# Patient Record
Sex: FEMALE | Race: WHITE | NOT HISPANIC OR LATINO | Employment: FULL TIME | URBAN - METROPOLITAN AREA
[De-identification: names, ages, dates, MRNs, and addresses within clinical notes are randomized per-mention and may not be internally consistent; named-entity substitution may affect disease eponyms.]

---

## 2023-12-21 ENCOUNTER — OFFICE VISIT (OUTPATIENT)
Age: 28
End: 2023-12-21
Payer: COMMERCIAL

## 2023-12-21 VITALS
WEIGHT: 148 LBS | TEMPERATURE: 98.5 F | OXYGEN SATURATION: 98 % | HEIGHT: 60 IN | DIASTOLIC BLOOD PRESSURE: 68 MMHG | SYSTOLIC BLOOD PRESSURE: 108 MMHG | HEART RATE: 82 BPM | BODY MASS INDEX: 29.06 KG/M2

## 2023-12-21 DIAGNOSIS — E28.2 PCOS (POLYCYSTIC OVARIAN SYNDROME): ICD-10-CM

## 2023-12-21 DIAGNOSIS — B37.31 CANDIDA VAGINITIS: ICD-10-CM

## 2023-12-21 DIAGNOSIS — J01.00 ACUTE NON-RECURRENT MAXILLARY SINUSITIS: Primary | ICD-10-CM

## 2023-12-21 DIAGNOSIS — J30.9 ALLERGIC RHINITIS, UNSPECIFIED SEASONALITY, UNSPECIFIED TRIGGER: ICD-10-CM

## 2023-12-21 PROBLEM — F41.9 ANXIETY: Status: ACTIVE | Noted: 2023-12-21

## 2023-12-21 PROBLEM — J34.2 DEVIATED SEPTUM: Status: ACTIVE | Noted: 2023-12-21

## 2023-12-21 PROCEDURE — 99204 OFFICE O/P NEW MOD 45 MIN: CPT | Performed by: INTERNAL MEDICINE

## 2023-12-21 RX ORDER — LORAZEPAM 0.5 MG/1
0.5 TABLET ORAL EVERY 6 HOURS PRN
COMMUNITY

## 2023-12-21 RX ORDER — METFORMIN HYDROCHLORIDE 500 MG/1
500 TABLET, EXTENDED RELEASE ORAL 2 TIMES DAILY
COMMUNITY
End: 2023-12-21 | Stop reason: SDUPTHER

## 2023-12-21 RX ORDER — METFORMIN HYDROCHLORIDE 500 MG/1
500 TABLET, EXTENDED RELEASE ORAL 2 TIMES DAILY
Qty: 180 TABLET | Refills: 1 | Status: SHIPPED | OUTPATIENT
Start: 2023-12-21

## 2023-12-21 RX ORDER — AZELASTINE 1 MG/ML
1 SPRAY, METERED NASAL 2 TIMES DAILY
COMMUNITY
End: 2023-12-21 | Stop reason: SDUPTHER

## 2023-12-21 RX ORDER — AMOXICILLIN 875 MG/1
875 TABLET, COATED ORAL 2 TIMES DAILY
Qty: 20 TABLET | Refills: 0 | Status: SHIPPED | OUTPATIENT
Start: 2023-12-21

## 2023-12-21 RX ORDER — FLUCONAZOLE 150 MG/1
TABLET ORAL
Qty: 2 TABLET | Refills: 0 | Status: SHIPPED | OUTPATIENT
Start: 2023-12-21

## 2023-12-21 RX ORDER — AZELASTINE 1 MG/ML
1 SPRAY, METERED NASAL 2 TIMES DAILY
Qty: 30 ML | Refills: 3 | Status: SHIPPED | OUTPATIENT
Start: 2023-12-21

## 2023-12-21 RX ORDER — MOMETASONE FUROATE 50 UG/1
2 SPRAY, METERED NASAL DAILY
COMMUNITY
End: 2023-12-21 | Stop reason: SDUPTHER

## 2023-12-21 RX ORDER — MOMETASONE FUROATE 50 UG/1
2 SPRAY, METERED NASAL DAILY
Qty: 17 G | Refills: 3 | Status: SHIPPED | OUTPATIENT
Start: 2023-12-21

## 2023-12-21 NOTE — PROGRESS NOTES
New Patient Note    Kamran Torres is a 28 y.o. female.    Chief Complaint   Patient presents with    Facial Pain     Comes and goes, but has been worse for the past few days. Hx of sinus issues and sinus infections during this time.        HPI    Frequent Sinus Infections  Allergic Rhinitis  - had sinus imaging that just showed deviated septum  - using Zyrtec, Azelastine, and Nasonex  - used to do nasal rinses and was told to stop bc thought was just irritating it more by previous provider  - advised to do allergy testing but just doesn't have time or money for that currently  - has been running low but not fully out of nasal spray  - pressure on the left cheek, pressure for month but worse the last week  - more congestion and only on left side  - blowing out some blood a couple of weeks ago  - shortly after had viral illness about month ago is when this started falring  - tooth pain on the left  - no fevers  - no ST  - no GI    PCOS  - diagnosed in 2020  - takes Metformin, helped with weight and hormonal acne  - used to have some TSH and hormonal things that she would like to discuss at next visit    Anxiety  - had thoughts of anxiety   - ADHD  - every other month, has Ativan and has been awhile  - thinking about establishing with Dr. Dickey    Past Medical History:  Patient Active Problem List   Diagnosis    Allergic rhinitis    PCOS (polycystic ovarian syndrome)    Acute non-recurrent maxillary sinusitis    Deviated septum    Anxiety        Medications:    Current Outpatient Medications:     azelastine (ASTELIN) 0.1 % nasal spray, 1 spray into the nostril(s) as directed by provider 2 (Two) Times a Day., Disp: 30 mL, Rfl: 3    LORazepam (Ativan) 0.5 MG tablet, Take 1 tablet by mouth Every 6 (Six) Hours As Needed., Disp: , Rfl:     metFORMIN ER (GLUCOPHAGE-XR) 500 MG 24 hr tablet, Take 1 tablet by mouth 2 (Two) Times a Day. One in the morning and one at night, Disp: 180 tablet, Rfl: 1    mometasone (NASONEX)  "50 MCG/ACT nasal spray, 2 sprays into the nostril(s) as directed by provider Daily., Disp: 17 g, Rfl: 3    amoxicillin (AMOXIL) 875 MG tablet, Take 1 tablet by mouth 2 (Two) Times a Day., Disp: 20 tablet, Rfl: 0    fluconazole (Diflucan) 150 MG tablet, Take 1 tab po on day 1 and then repeat in 72hours, Disp: 2 tablet, Rfl: 0     Allergy to Medications:  No Known Allergies     Immunizations:  Immunization History   Administered Date(s) Administered    Flublok 18+yrs 09/28/2023     Surgeries:  Past Surgical History:   Procedure Laterality Date    OVARIAN CYST REMOVAL  02/2023    WISDOM TOOTH EXTRACTION  2020        Family History:  Family History   Problem Relation Age of Onset    Diabetes Father     Hyperlipidemia Father     Mental illness Paternal Grandmother     Breast cancer Other         Great AUNT, older at dx    Colon cancer Neg Hx     Ovarian cancer Neg Hx     Coronary artery disease Neg Hx     Stroke Neg Hx         Social:  Tobacco Use:no  ETOH Use:yes  Drug Use:no  Household:  Occupation:  Hobbies:  Exercise:      Review of Systems    Objective   /68   Pulse 82   Temp 98.5 °F (36.9 °C) (Oral)   Ht 153 cm (60.24\")   Wt 67.1 kg (148 lb)   SpO2 98%   BMI 28.68 kg/m²        Physical Exam  Vitals reviewed.   Constitutional:       General: She is not in acute distress.     Appearance: Normal appearance.   HENT:      Right Ear: Ear canal and external ear normal.      Left Ear: Ear canal and external ear normal.      Ears:      Comments: Bilateral TM with dull LR, no erythema, bulging     Nose:      Right Sinus: No maxillary sinus tenderness or frontal sinus tenderness.      Left Sinus: Maxillary sinus tenderness present. No frontal sinus tenderness.      Comments: TTP over left ethmoid sinus     Mouth/Throat:      Mouth: Mucous membranes are moist.   Eyes:      Conjunctiva/sclera: Conjunctivae normal.   Cardiovascular:      Rate and Rhythm: Normal rate and regular rhythm.      Heart sounds: Normal heart " sounds. No murmur heard.  Pulmonary:      Effort: Pulmonary effort is normal. No respiratory distress.      Breath sounds: Normal breath sounds.   Abdominal:      General: Abdomen is flat. Bowel sounds are normal. There is no distension.      Palpations: Abdomen is soft.      Tenderness: There is no abdominal tenderness.   Musculoskeletal:      Cervical back: Neck supple.   Skin:     General: Skin is warm and dry.   Neurological:      Mental Status: She is alert.         Assessment & Plan   1. Acute non-recurrent maxillary sinusitis  - amoxicillin (AMOXIL) 875 MG tablet; Take 1 tablet by mouth 2 (Two) Times a Day.  Dispense: 20 tablet; Refill: 0    2. PCOS (polycystic ovarian syndrome)  - metFORMIN ER (GLUCOPHAGE-XR) 500 MG 24 hr tablet; Take 1 tablet by mouth 2 (Two) Times a Day. One in the morning and one at night  Dispense: 180 tablet; Refill: 1    3. Allergic rhinitis, unspecified seasonality, unspecified trigger  - azelastine (ASTELIN) 0.1 % nasal spray; 1 spray into the nostril(s) as directed by provider 2 (Two) Times a Day.  Dispense: 30 mL; Refill: 3  - mometasone (NASONEX) 50 MCG/ACT nasal spray; 2 sprays into the nostril(s) as directed by provider Daily.  Dispense: 17 g; Refill: 3    4. Candida vaginitis  - fluconazole (Diflucan) 150 MG tablet; Take 1 tab po on day 1 and then repeat in 72hours  Dispense: 2 tablet; Refill: 0    Next visit:  Will discuss PCOS, anxiety, Health Maintenance further at next visit    FU in 1mo for raquel Bradley MD, FAAP, FACP  Internal Medicine and Pediatrics  [facility]

## 2023-12-29 ENCOUNTER — OFFICE VISIT (OUTPATIENT)
Age: 28
End: 2023-12-29
Payer: COMMERCIAL

## 2023-12-29 VITALS
SYSTOLIC BLOOD PRESSURE: 112 MMHG | WEIGHT: 148 LBS | HEART RATE: 59 BPM | OXYGEN SATURATION: 97 % | BODY MASS INDEX: 29.06 KG/M2 | HEIGHT: 60 IN | DIASTOLIC BLOOD PRESSURE: 70 MMHG

## 2023-12-29 DIAGNOSIS — J02.9 SORE THROAT: ICD-10-CM

## 2023-12-29 DIAGNOSIS — J06.9 UPPER RESPIRATORY VIRUS: Primary | ICD-10-CM

## 2023-12-29 DIAGNOSIS — R09.81 NASAL CONGESTION: ICD-10-CM

## 2023-12-29 LAB
EXPIRATION DATE: NORMAL
EXPIRATION DATE: NORMAL
FLUAV AG UPPER RESP QL IA.RAPID: NOT DETECTED
FLUBV AG UPPER RESP QL IA.RAPID: NOT DETECTED
INTERNAL CONTROL: NORMAL
INTERNAL CONTROL: NORMAL
Lab: NORMAL
Lab: NORMAL
S PYO AG THROAT QL: NEGATIVE
SARS-COV-2 AG UPPER RESP QL IA.RAPID: NOT DETECTED

## 2023-12-29 PROCEDURE — 87880 STREP A ASSAY W/OPTIC: CPT | Performed by: NURSE PRACTITIONER

## 2023-12-29 PROCEDURE — 87428 SARSCOV & INF VIR A&B AG IA: CPT | Performed by: NURSE PRACTITIONER

## 2023-12-29 PROCEDURE — 99213 OFFICE O/P EST LOW 20 MIN: CPT | Performed by: NURSE PRACTITIONER

## 2023-12-29 NOTE — PROGRESS NOTES
"Chief Complaint  Cough, Nasal Congestion, Fatigue, Sore Throat, and Exposure To Known Illness (Flu on [place] day )    Subjective        Melissa Kwon presents to [facility]  History of Present Illness  Pt is here today with respiratory symptoms on-going for several days. She was seen 8 days ago and prescribed amoxicillin. She has been unable to finish due to GI upset. Following her initial visit, she was exposed to flu. Symptoms had started to improve prior to flu exposure.     Objective   Vital Signs:  /70   Pulse 59   Ht 153 cm (60.24\")   Wt 67.1 kg (148 lb)   SpO2 97%   BMI 28.68 kg/m²   Estimated body mass index is 28.68 kg/m² as calculated from the following:    Height as of this encounter: 153 cm (60.24\").    Weight as of this encounter: 67.1 kg (148 lb).           Physical Exam  Vitals reviewed.   Constitutional:       Appearance: Normal appearance.   HENT:      Right Ear: Tympanic membrane, ear canal and external ear normal.      Left Ear: Tympanic membrane, ear canal and external ear normal.      Nose: Congestion and rhinorrhea present.      Mouth/Throat:      Mouth: Mucous membranes are moist.      Pharynx: Oropharynx is clear.   Eyes:      Conjunctiva/sclera: Conjunctivae normal.   Cardiovascular:      Rate and Rhythm: Normal rate and regular rhythm.      Heart sounds: Normal heart sounds.   Pulmonary:      Effort: Pulmonary effort is normal.      Breath sounds: Normal breath sounds.   Musculoskeletal:         General: Normal range of motion.      Cervical back: Normal range of motion.   Skin:     General: Skin is warm.   Neurological:      Mental Status: She is alert and oriented to person, place, and time.   Psychiatric:         Mood and Affect: Mood normal.         Behavior: Behavior normal.         Thought Content: Thought content normal.        Result Review :          Results for orders placed or performed in visit on 12/29/23   POCT SARS-CoV-2 Antigen АНДРЕЙ " + Flu    Specimen: Swab   Result Value Ref Range    SARS Antigen Not Detected Not Detected, Presumptive Negative    Influenza A Antigen АНДРЕЙ Not Detected Not Detected    Influenza B Antigen АНДРЕЙ Not Detected Not Detected    Internal Control Passed Passed    Lot Number [id number]     Expiration Date 10/27/2024    POCT rapid strep A    Specimen: Swab   Result Value Ref Range    Rapid Strep A Screen Negative Negative, VALID, INVALID, Not Performed    Internal Control Passed Passed    Lot Number [id number]     Expiration Date 06/11/2026               Assessment and Plan   Diagnoses and all orders for this visit:    1. Upper respiratory virus (Primary)    2. Sore throat  -     POCT rapid strep A    3. Nasal congestion  -     POCT SARS-CoV-2 Antigen АНДРЕЙ + Flu     - Tylenol or ibuprofen prn if fevers develop   - Dayquil / Nyquil or other over the counter cold medications for symptom management.         Follow Up   No follow-ups on file.  Patient was given instructions and counseling regarding her condition or for health maintenance advice. Please see specific information pulled into the AVS if appropriate.

## 2024-01-29 ENCOUNTER — OFFICE VISIT (OUTPATIENT)
Age: 29
End: 2024-01-29
Payer: COMMERCIAL

## 2024-01-29 VITALS
WEIGHT: 148 LBS | DIASTOLIC BLOOD PRESSURE: 80 MMHG | OXYGEN SATURATION: 98 % | HEIGHT: 60 IN | BODY MASS INDEX: 29.06 KG/M2 | TEMPERATURE: 98.7 F | HEART RATE: 83 BPM | SYSTOLIC BLOOD PRESSURE: 128 MMHG

## 2024-01-29 DIAGNOSIS — M51.16 RADICULOPATHY DUE TO DISORDER OF INTERVERTEBRAL DISC OF LUMBAR SPINE: ICD-10-CM

## 2024-01-29 DIAGNOSIS — M25.552 LEFT HIP PAIN: Primary | ICD-10-CM

## 2024-01-29 PROCEDURE — 99214 OFFICE O/P EST MOD 30 MIN: CPT | Performed by: INTERNAL MEDICINE

## 2024-01-29 RX ORDER — METHYLPREDNISOLONE 4 MG/1
4 TABLET ORAL DAILY
COMMUNITY
Start: 2024-01-27

## 2024-01-29 NOTE — PROGRESS NOTES
Progress Note    Subjective      Melissa is a 28 y.o. female.    Chief Complaint   Patient presents with    Back Pain     On and off for a few years.     Leg Pain     Started on the 18th, burning and tingling on the lower leg       HPI    Left Leg  - started bothering her on 1/18/24  - tingling in a ring around the calf on posterior side  - day after it was super cold, so thought originally maybe circulation  - nerve pain burning and tingling  - no swelling, no redness, no true achy pain  - sometimes leg feels a little heavier   - on 1/18 it crept in, did not wake up with it  - no different positioning  - no color change in regard to more pale or blue  - more prominent veins, no real discomfort just visibly looks that way    Back and Left Hip  - Left Back paraspinal muscles  - Pain from back into bottom and leg  - pain on backside  - achy pain  - no shooting or tingling or burning pain coming down  - 4 years or so  - did PT previously only for left knee  - walking a lot, she notices that she gets tired on that side  - some pain in the hip on the side  - no imaging of back or hip  - Ibuprofen 600-800mg once daily PRN does help  - heating pad does help  - stretching at home, yoga  - no saddle paresthesia  - no incontinence  - no true weakness, but perceived  - some altered sensation but no loss of sensation    Past Medical History:  Patient Active Problem List   Diagnosis    Allergic rhinitis    PCOS (polycystic ovarian syndrome)    Acute non-recurrent maxillary sinusitis    Deviated septum    Anxiety       Medications:  Current Outpatient Medications on File Prior to Visit   Medication Sig Dispense Refill    azelastine (ASTELIN) 0.1 % nasal spray 1 spray into the nostril(s) as directed by provider 2 (Two) Times a Day. 30 mL 3    fluconazole (Diflucan) 150 MG tablet Take 1 tab po on day 1 and then repeat in 72hours 2 tablet 0    LORazepam (Ativan) 0.5 MG tablet Take 1 tablet by mouth Every 6 (Six) Hours As Needed.       "metFORMIN ER (GLUCOPHAGE-XR) 500 MG 24 hr tablet Take 1 tablet by mouth 2 (Two) Times a Day. One in the morning and one at night 180 tablet 1    methylPREDNISolone (MEDROL) 4 MG dose pack Take 1 tablet by mouth Daily.      mometasone (NASONEX) 50 MCG/ACT nasal spray 2 sprays into the nostril(s) as directed by provider Daily. 17 g 3     No current facility-administered medications on file prior to visit.       Allergies:   No Known Allergies    Immunizations:  Immunization History   Administered Date(s) Administered    Flublok 18+yrs 09/28/2023        Family History:  Family History   Problem Relation Age of Onset    Diabetes Father     Hyperlipidemia Father     Mental illness Paternal Grandmother     Breast cancer Other         Great AUNT, older at dx    Colon cancer Neg Hx     Ovarian cancer Neg Hx     Coronary artery disease Neg Hx     Stroke Neg Hx        Objective   /80   Pulse 83   Temp 98.7 °F (37.1 °C)   Ht 153 cm (60.24\")   Wt 67.1 kg (148 lb)   SpO2 98%   BMI 28.68 kg/m²        Physical Exam  Vitals reviewed.   Constitutional:       General: She is not in acute distress.  HENT:      Mouth/Throat:      Mouth: Mucous membranes are moist.   Eyes:      Conjunctiva/sclera: Conjunctivae normal.   Musculoskeletal:      Comments: No TTP over the lumbosacral spine. TTP present over the left paraspinal muscles.   5/5 strength in the LEFT L4/L5/S1 distribution.   Altered sensation over the bottom, back of thigh extending into leg and at web space by great toe on left and lateral margin of left foot.   2+ DTR bilaterally in knee, ankle of the LE  Negative SLR bilaterally  MARLEY positive for left groin pain  Pain with flexion of left hip   Neurological:      Mental Status: She is alert.         Assessment & Plan     Lumbar Radiculopathy on the Left  - described historically some altered sensation in the L5/S1 distribution, along with a perceived heaviness with motor in the LLE, pain in the back, extending " down the posterior aspect of the LLE into foot  - exam - altered sensation in L5/S1 distribution continuous from bottom to foot on left, normal strength dominic LE, normal DTR  - ordered Lumbar XR  - continue Medrol dose pack  - advised to increase Ibuprofen to 800q8 while awake or use Naproxen. Can use pepcid with it if having GI irritation  - continue home stretching, exercises given, advised if not improving further in the next 2 weeks, to let us know and will start PT    Left Hip Pain  - some pain may be due to lumbar radiculopathy  - ordered XR of hip on left  - discussed that pain with MARLEY and flexion , may signal some additional arthritic features separate from lumbar radiculopathy  - NSAIDs  - may need PT as well  - continue home stretching    FU PRN      Radha Bradley MD, FAAP, FACP  Internal Medicine and Pediatrics  [facility]

## 2024-02-27 PROBLEM — K58.8 OTHER IRRITABLE BOWEL SYNDROME: Status: ACTIVE | Noted: 2024-02-27

## 2024-02-27 PROBLEM — G43.109 OCULAR MIGRAINE: Status: ACTIVE | Noted: 2024-02-27

## 2024-02-27 PROBLEM — H69.93 DYSFUNCTION OF BOTH EUSTACHIAN TUBES: Status: ACTIVE | Noted: 2024-02-27

## 2024-02-27 PROBLEM — F41.0 PANIC DISORDER: Status: ACTIVE | Noted: 2024-02-27

## 2024-02-27 PROBLEM — F60.3 BORDERLINE PERSONALITY DISORDER: Status: ACTIVE | Noted: 2024-02-27

## 2024-02-27 PROBLEM — H69.90 DYSFUNCTION OF EUSTACHIAN TUBE: Status: ACTIVE | Noted: 2024-02-27

## 2024-02-27 PROBLEM — M72.2 PLANTAR FASCIITIS: Status: ACTIVE | Noted: 2024-02-27

## 2024-03-08 ENCOUNTER — LAB (OUTPATIENT)
Age: 29
End: 2024-03-08
Payer: COMMERCIAL

## 2024-03-08 ENCOUNTER — OFFICE VISIT (OUTPATIENT)
Age: 29
End: 2024-03-08
Payer: COMMERCIAL

## 2024-03-08 VITALS
HEIGHT: 60 IN | SYSTOLIC BLOOD PRESSURE: 110 MMHG | WEIGHT: 145 LBS | HEART RATE: 96 BPM | DIASTOLIC BLOOD PRESSURE: 70 MMHG | BODY MASS INDEX: 28.47 KG/M2 | OXYGEN SATURATION: 97 %

## 2024-03-08 DIAGNOSIS — F41.9 ANXIETY HYPERVENTILATION: Primary | ICD-10-CM

## 2024-03-08 DIAGNOSIS — Z13.220 SCREENING FOR LIPOID DISORDERS: ICD-10-CM

## 2024-03-08 DIAGNOSIS — R73.9 BLOOD GLUCOSE ELEVATED: ICD-10-CM

## 2024-03-08 DIAGNOSIS — E66.3 OVERWEIGHT WITH BODY MASS INDEX (BMI) OF 28 TO 28.9 IN ADULT: ICD-10-CM

## 2024-03-08 DIAGNOSIS — E28.2 PCOS (POLYCYSTIC OVARIAN SYNDROME): ICD-10-CM

## 2024-03-08 DIAGNOSIS — E66.3 SEVERELY OVERWEIGHT: ICD-10-CM

## 2024-03-08 DIAGNOSIS — Z13.220 SCREENING FOR HYPERLIPIDEMIA: ICD-10-CM

## 2024-03-08 DIAGNOSIS — F41.9 ANXIETY: ICD-10-CM

## 2024-03-08 DIAGNOSIS — F45.8 ANXIETY HYPERVENTILATION: Primary | ICD-10-CM

## 2024-03-08 DIAGNOSIS — R73.9 HYPERGLYCEMIA: ICD-10-CM

## 2024-03-08 DIAGNOSIS — Z11.4 SCREENING FOR HIV (HUMAN IMMUNODEFICIENCY VIRUS): ICD-10-CM

## 2024-03-08 DIAGNOSIS — F41.0 PANIC DISORDER: ICD-10-CM

## 2024-03-08 DIAGNOSIS — F41.9 ANXIETY: Primary | ICD-10-CM

## 2024-03-08 DIAGNOSIS — J30.9 ALLERGIC RHINITIS, UNSPECIFIED SEASONALITY, UNSPECIFIED TRIGGER: ICD-10-CM

## 2024-03-08 DIAGNOSIS — E55.9 AVITAMINOSIS D: ICD-10-CM

## 2024-03-08 DIAGNOSIS — E28.2 POLYCYSTIC OVARIES: ICD-10-CM

## 2024-03-08 LAB
25(OH)D3 SERPL-MCNC: 22.9 NG/ML (ref 30–100)
ALBUMIN SERPL-MCNC: 4.4 G/DL (ref 3.5–5.2)
ALBUMIN/GLOB SERPL: 1.8 G/DL
ALP SERPL-CCNC: 55 U/L (ref 39–117)
ALT SERPL W P-5'-P-CCNC: 11 U/L (ref 1–33)
ANION GAP SERPL CALCULATED.3IONS-SCNC: 12.2 MMOL/L (ref 5–15)
AST SERPL-CCNC: 18 U/L (ref 1–32)
BASOPHILS # BLD AUTO: 0.09 10*3/MM3 (ref 0–0.2)
BASOPHILS NFR BLD AUTO: 0.8 % (ref 0–1.5)
BILIRUB SERPL-MCNC: 0.3 MG/DL (ref 0–1.2)
BUN SERPL-MCNC: 8 MG/DL (ref 6–20)
BUN/CREAT SERPL: 10 (ref 7–25)
CALCIUM SPEC-SCNC: 9.5 MG/DL (ref 8.6–10.5)
CHLORIDE SERPL-SCNC: 103 MMOL/L (ref 98–107)
CHOLEST SERPL-MCNC: 180 MG/DL (ref 0–200)
CO2 SERPL-SCNC: 25.8 MMOL/L (ref 22–29)
CREAT SERPL-MCNC: 0.8 MG/DL (ref 0.57–1)
DEPRECATED RDW RBC AUTO: 38.2 FL (ref 37–54)
EGFRCR SERPLBLD CKD-EPI 2021: 103.1 ML/MIN/1.73
EOSINOPHIL # BLD AUTO: 0.13 10*3/MM3 (ref 0–0.4)
EOSINOPHIL NFR BLD AUTO: 1.1 % (ref 0.3–6.2)
ERYTHROCYTE [DISTWIDTH] IN BLOOD BY AUTOMATED COUNT: 11.5 % (ref 12.3–15.4)
GLOBULIN UR ELPH-MCNC: 2.5 GM/DL
GLUCOSE SERPL-MCNC: 80 MG/DL (ref 65–99)
HBA1C MFR BLD: 4.8 % (ref 4.8–5.6)
HCT VFR BLD AUTO: 43 % (ref 34–46.6)
HDLC SERPL-MCNC: 57 MG/DL (ref 40–60)
HGB BLD-MCNC: 14 G/DL (ref 12–15.9)
IMM GRANULOCYTES # BLD AUTO: 0.02 10*3/MM3 (ref 0–0.05)
IMM GRANULOCYTES NFR BLD AUTO: 0.2 % (ref 0–0.5)
LDLC SERPL CALC-MCNC: 110 MG/DL (ref 0–100)
LDLC/HDLC SERPL: 1.91 {RATIO}
LYMPHOCYTES # BLD AUTO: 2.47 10*3/MM3 (ref 0.7–3.1)
LYMPHOCYTES NFR BLD AUTO: 21.8 % (ref 19.6–45.3)
MCH RBC QN AUTO: 29.5 PG (ref 26.6–33)
MCHC RBC AUTO-ENTMCNC: 32.6 G/DL (ref 31.5–35.7)
MCV RBC AUTO: 90.5 FL (ref 79–97)
MONOCYTES # BLD AUTO: 0.55 10*3/MM3 (ref 0.1–0.9)
MONOCYTES NFR BLD AUTO: 4.9 % (ref 5–12)
NEUTROPHILS NFR BLD AUTO: 71.2 % (ref 42.7–76)
NEUTROPHILS NFR BLD AUTO: 8.05 10*3/MM3 (ref 1.7–7)
NRBC BLD AUTO-RTO: 0 /100 WBC (ref 0–0.2)
PLATELET # BLD AUTO: 313 10*3/MM3 (ref 140–450)
PMV BLD AUTO: 10.9 FL (ref 6–12)
POTASSIUM SERPL-SCNC: 3.7 MMOL/L (ref 3.5–5.2)
PROT SERPL-MCNC: 6.9 G/DL (ref 6–8.5)
RBC # BLD AUTO: 4.75 10*6/MM3 (ref 3.77–5.28)
SODIUM SERPL-SCNC: 141 MMOL/L (ref 136–145)
T4 FREE SERPL-MCNC: 1.15 NG/DL (ref 0.93–1.7)
TRIGL SERPL-MCNC: 72 MG/DL (ref 0–150)
TSH SERPL DL<=0.05 MIU/L-ACNC: 3.25 UIU/ML (ref 0.27–4.2)
VLDLC SERPL-MCNC: 13 MG/DL (ref 5–40)
WBC NRBC COR # BLD AUTO: 11.31 10*3/MM3 (ref 3.4–10.8)

## 2024-03-08 PROCEDURE — 36415 COLL VENOUS BLD VENIPUNCTURE: CPT | Performed by: INTERNAL MEDICINE

## 2024-03-08 PROCEDURE — 84439 ASSAY OF FREE THYROXINE: CPT | Performed by: INTERNAL MEDICINE

## 2024-03-08 PROCEDURE — 80061 LIPID PANEL: CPT | Performed by: INTERNAL MEDICINE

## 2024-03-08 PROCEDURE — 80050 GENERAL HEALTH PANEL: CPT | Performed by: INTERNAL MEDICINE

## 2024-03-08 PROCEDURE — 82306 VITAMIN D 25 HYDROXY: CPT | Performed by: INTERNAL MEDICINE

## 2024-03-08 PROCEDURE — 83036 HEMOGLOBIN GLYCOSYLATED A1C: CPT | Performed by: INTERNAL MEDICINE
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
## 2024-03-14 ENCOUNTER — OFFICE VISIT (OUTPATIENT)
Age: 29
End: 2024-03-14
Payer: COMMERCIAL

## 2024-03-14 VITALS
SYSTOLIC BLOOD PRESSURE: 110 MMHG | DIASTOLIC BLOOD PRESSURE: 60 MMHG | OXYGEN SATURATION: 98 % | BODY MASS INDEX: 28.47 KG/M2 | WEIGHT: 145 LBS | HEIGHT: 60 IN | HEART RATE: 80 BPM

## 2024-03-14 DIAGNOSIS — F41.0 PANIC DISORDER: ICD-10-CM

## 2024-03-14 DIAGNOSIS — J30.9 ALLERGIC RHINITIS, UNSPECIFIED SEASONALITY, UNSPECIFIED TRIGGER: ICD-10-CM

## 2024-03-14 DIAGNOSIS — F41.9 ANXIETY: Primary | ICD-10-CM

## 2024-03-14 RX ORDER — VILAZODONE HYDROCHLORIDE 10 MG/1
TABLET ORAL
Qty: 49 TABLET | Refills: 1 | Status: SHIPPED | OUTPATIENT
Start: 2024-03-14

## 2024-03-14 RX ORDER — MOMETASONE FUROATE 50 UG/1
2 SPRAY, METERED NASAL DAILY
Qty: 17 G | Refills: 3 | Status: SHIPPED | OUTPATIENT
Start: 2024-03-14

## 2024-03-14 RX ORDER — AZELASTINE 1 MG/ML
1 SPRAY, METERED NASAL 2 TIMES DAILY
Qty: 30 ML | Refills: 3 | Status: SHIPPED | OUTPATIENT
Start: 2024-03-14
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
## 2024-03-15 ENCOUNTER — PRIOR AUTHORIZATION (OUTPATIENT)
Age: 29
End: 2024-03-15
Payer: COMMERCIAL

## 2024-03-15 NOTE — TELEPHONE ENCOUNTER
Melissa Kwon (Key: [id number]) - [id number]  Vilazodone HCl 10MG tablets  Status: PA Request  Created: March 14th, 2024 [phone]  Sent: March 15th, 2024

## 2024-04-01 DIAGNOSIS — E28.2 PCOS (POLYCYSTIC OVARIAN SYNDROME): ICD-10-CM

## 2024-04-01 RX ORDER — METFORMIN HYDROCHLORIDE 500 MG/1
500 TABLET, EXTENDED RELEASE ORAL 2 TIMES DAILY
Qty: 180 TABLET | Refills: 1 | Status: SHIPPED | OUTPATIENT
Start: 2024-04-01

## 2024-04-01 NOTE — TELEPHONE ENCOUNTER
Rx Refill Note  Requested Prescriptions     Pending Prescriptions Disp Refills    metFORMIN ER (GLUCOPHAGE-XR) 500 MG 24 hr tablet 180 tablet 1     Sig: Take 1 tablet by mouth 2 (Two) Times a Day. One in the morning and one at night      Last office visit with prescribing clinician: 3/14/2024   Last telemedicine visit with prescribing clinician: Visit date not found   Next office visit with prescribing clinician: Visit date not found       Nida Cabrera MA  04/01/24, 13:56 EDT    Rx sent

## 2024-04-09 ENCOUNTER — OFFICE VISIT (OUTPATIENT)
Age: 29
End: 2024-04-09
Payer: COMMERCIAL

## 2024-04-09 VITALS
BODY MASS INDEX: 29.17 KG/M2 | TEMPERATURE: 98.4 F | OXYGEN SATURATION: 99 % | DIASTOLIC BLOOD PRESSURE: 60 MMHG | WEIGHT: 148.6 LBS | SYSTOLIC BLOOD PRESSURE: 110 MMHG | HEIGHT: 60 IN | HEART RATE: 88 BPM

## 2024-04-09 DIAGNOSIS — F41.9 ANXIETY: ICD-10-CM

## 2024-04-09 DIAGNOSIS — E55.9 VITAMIN D DEFICIENCY: ICD-10-CM

## 2024-04-09 DIAGNOSIS — E66.3 OVERWEIGHT WITH BODY MASS INDEX (BMI) OF 28 TO 28.9 IN ADULT: ICD-10-CM

## 2024-04-09 DIAGNOSIS — F33.1 MODERATE EPISODE OF RECURRENT MAJOR DEPRESSIVE DISORDER: Primary | ICD-10-CM

## 2024-04-09 DIAGNOSIS — F41.0 PANIC DISORDER: ICD-10-CM

## 2024-04-09 PROCEDURE — 99214 OFFICE O/P EST MOD 30 MIN: CPT | Performed by: INTERNAL MEDICINE

## 2024-04-09 RX ORDER — VILAZODONE HYDROCHLORIDE 40 MG/1
40 TABLET ORAL DAILY
Qty: 30 TABLET | Refills: 1 | Status: SHIPPED | OUTPATIENT
Start: 2024-04-09

## 2024-04-09 NOTE — PROGRESS NOTES
Progress Note    Subjective      Melissa is a 29 y.o. female.    Chief Complaint   Patient presents with    Anxiety     Pt requesting 20mg of Viibrd        Anxiety      Depression  Anxiety  - feels like the Viibryd is helping  - she is at the 20mg dose  - she feels like some of her depressive sx are still not well controlled  - depressive sx sometimes leads to emotional eating    Overweight BMI 28  - she is up about 3lbs  - she has recognized eating more and trying to consciously eat less    Vitamin D Def  - replacing vitamin D    Past Medical History:  Patient Active Problem List   Diagnosis    Allergic rhinitis    PCOS (polycystic ovarian syndrome)    Acute non-recurrent maxillary sinusitis    Deviated septum    Anxiety    Dysfunction of eustachian tube    Dysfunction of both eustachian tubes    Other irritable bowel syndrome    Borderline personality disorder    Ocular migraine    Panic disorder    Plantar fasciitis    Overweight with body mass index (BMI) of 28 to 28.9 in adult       Medications:  Current Outpatient Medications on File Prior to Visit   Medication Sig Dispense Refill    azelastine (ASTELIN) 0.1 % nasal spray 1 spray into the nostril(s) as directed by provider 2 (Two) Times a Day. 30 mL 3    LORazepam (Ativan) 0.5 MG tablet Take 1 tablet by mouth Every 6 (Six) Hours As Needed.      metFORMIN ER (GLUCOPHAGE-XR) 500 MG 24 hr tablet Take 1 tablet by mouth 2 (Two) Times a Day. One in the morning and one at night 180 tablet 1    methylPREDNISolone (MEDROL) 4 MG dose pack Take 1 tablet by mouth Daily.      mometasone (NASONEX) 50 MCG/ACT nasal spray 2 sprays into the nostril(s) as directed by provider Daily. 17 g 3    [DISCONTINUED] vilazodone (VIIBRYD) 10 MG tablet tablet 1 tab po daily for 1 week, from then on 2 tab po daily 49 tablet 1    [DISCONTINUED] vilazodone (VIIBRYD) 10 MG tablet tablet Take 1 tablet by mouth Daily for 7 days, THEN 2 tablets Daily 49 tablet 0     No current facility-administered  "medications on file prior to visit.       Allergies:   No Known Allergies    Immunizations:  Immunization History   Administered Date(s) Administered    Flublok 18+yrs 09/28/2023    Tdap 03/18/2021        Family History:  Family History   Problem Relation Age of Onset    Diabetes Father     Hyperlipidemia Father     Mental illness Paternal Grandmother     Breast cancer Other         Great AUNT, older at dx    Colon cancer Neg Hx     Ovarian cancer Neg Hx     Coronary artery disease Neg Hx     Stroke Neg Hx        Social History:  Social History     Socioeconomic History    Marital status: Single   Tobacco Use    Smoking status: Never    Smokeless tobacco: Never   Vaping Use    Vaping status: Never Used   Substance and Sexual Activity    Alcohol use: Yes     Alcohol/week: 2.0 - 3.0 standard drinks of alcohol     Types: 2 - 3 Drinks containing 0.5 oz of alcohol per week     Comment: A month    Drug use: Never    Sexual activity: Yes     Partners: Female     Birth control/protection: Same-sex partner     Comment: 1 partner currently, long term relationship       Objective   /60   Pulse 88   Temp 98.4 °F (36.9 °C)   Ht 153 cm (60.24\")   Wt 67.4 kg (148 lb 9.6 oz)   SpO2 99%   BMI 28.79 kg/m²     Physical Exam  Vitals reviewed.   Constitutional:       General: She is not in acute distress.  HENT:      Nose: Nose normal.      Mouth/Throat:      Mouth: Mucous membranes are moist.   Eyes:      Conjunctiva/sclera: Conjunctivae normal.   Pulmonary:      Effort: Pulmonary effort is normal.   Neurological:      Mental Status: She is alert.   Psychiatric:         Thought Content: Thought content normal.         Judgment: Judgment normal.      Comments: Smiling, good eye contact         Assessment & Plan     1. Anxiety, Depression, Panic Disorder  - patient reports that she was dx with Borderline Personality DO around time of previous suicide attempt, however subsequently Psych was not sure that this was accurate  - " failed multiple SSRI, SNRI, Mood stabilizers, see previous note for listing  - family history of bipolar and feels does cycle from depression to having energy to do things, though never dx BAD and does not exhibit true rocio  - Gene Sight testing completed  - no SI/HI  - will increase Viibryd from 20mg to 40mg  - will FU in 4 weeks  - call if any worsening of sx and stop med     2. Overweight BMI 28  - discussed lifestyle changes that she is making  - she feels some of the increased eating is emotional eating  - will increase Viibryd to see if we can improve the depressive sx if emotional eating will decrease     3. Low Vitamin D  - continue 1000 IU daily  - can repeat in 8-12 weeks from initial lab draw    FU 4 weeks      Radha Bradley MD, FAAP, FACP  Internal Medicine and Pediatrics  [facility]

## 2024-04-30 ENCOUNTER — OFFICE VISIT (OUTPATIENT)
Age: 29
End: 2024-04-30
Payer: COMMERCIAL

## 2024-04-30 VITALS
DIASTOLIC BLOOD PRESSURE: 76 MMHG | WEIGHT: 151 LBS | OXYGEN SATURATION: 98 % | BODY MASS INDEX: 29.64 KG/M2 | SYSTOLIC BLOOD PRESSURE: 122 MMHG | HEART RATE: 86 BPM | HEIGHT: 60 IN

## 2024-04-30 DIAGNOSIS — F41.9 ANXIETY: Primary | ICD-10-CM

## 2024-04-30 DIAGNOSIS — E66.3 OVERWEIGHT WITH BODY MASS INDEX (BMI) OF 29 TO 29.9 IN ADULT: ICD-10-CM

## 2024-04-30 DIAGNOSIS — F33.1 MODERATE EPISODE OF RECURRENT MAJOR DEPRESSIVE DISORDER: ICD-10-CM

## 2024-04-30 PROCEDURE — 99214 OFFICE O/P EST MOD 30 MIN: CPT | Performed by: INTERNAL MEDICINE

## 2024-04-30 RX ORDER — BUPROPION HYDROCHLORIDE 150 MG/1
150 TABLET ORAL DAILY
Qty: 30 TABLET | Refills: 1 | Status: SHIPPED | OUTPATIENT
Start: 2024-04-30

## 2024-05-01 NOTE — PROGRESS NOTES
Progress Note    Subjective      Melissa is a 29 y.o. female.    Chief Complaint   Patient presents with    Anxiety       Anxiety      Depression  Anxiety  - feels like the Viibryd is helping  - she is at the 40mg dose  - she feels like some of her depressive sx are still not well controlled, still experiencing anhedonia and lack of motivation  - depressive sx sometimes leads to emotional eating     Overweight BMI 29  - she is up about 6lbs  - she has recognized eating more and trying to consciously eat less  - feels hunger all the time  - was having some nausea  - eating more has stopped that    Past Medical History:  Patient Active Problem List   Diagnosis    Allergic rhinitis    PCOS (polycystic ovarian syndrome)    Acute non-recurrent maxillary sinusitis    Deviated septum    Anxiety    Dysfunction of eustachian tube    Dysfunction of both eustachian tubes    Other irritable bowel syndrome    Borderline personality disorder    Ocular migraine    Panic disorder    Plantar fasciitis    Overweight with body mass index (BMI) of 28 to 28.9 in adult    Moderate episode of recurrent major depressive disorder       Medications:  Current Outpatient Medications on File Prior to Visit   Medication Sig Dispense Refill    azelastine (ASTELIN) 0.1 % nasal spray 1 spray into the nostril(s) as directed by provider 2 (Two) Times a Day. 30 mL 3    LORazepam (Ativan) 0.5 MG tablet Take 1 tablet by mouth Every 6 (Six) Hours As Needed.      metFORMIN ER (GLUCOPHAGE-XR) 500 MG 24 hr tablet Take 1 tablet by mouth 2 (Two) Times a Day. One in the morning and one at night 180 tablet 1    methylPREDNISolone (MEDROL) 4 MG dose pack Take 1 tablet by mouth Daily.      mometasone (NASONEX) 50 MCG/ACT nasal spray 2 sprays into the nostril(s) as directed by provider Daily. 17 g 3    vilazodone (VIIBRYD) 40 MG tablet tablet Take 1 tablet by mouth Daily. 30 tablet 1     No current facility-administered medications on file prior to visit.  "      Allergies:   No Known Allergies    Immunizations:  Immunization History   Administered Date(s) Administered    Flublok 18+yrs 09/28/2023    Tdap 03/18/2021        Family History:  Family History   Problem Relation Age of Onset    Diabetes Father     Hyperlipidemia Father     Mental illness Paternal Grandmother     Breast cancer Other         Great AUNT, older at dx    Colon cancer Neg Hx     Ovarian cancer Neg Hx     Coronary artery disease Neg Hx     Stroke Neg Hx        Social History:  Social History     Socioeconomic History    Marital status: Single   Tobacco Use    Smoking status: Never    Smokeless tobacco: Never   Vaping Use    Vaping status: Never Used   Substance and Sexual Activity    Alcohol use: Yes     Alcohol/week: 2.0 - 3.0 standard drinks of alcohol     Types: 2 - 3 Drinks containing 0.5 oz of alcohol per week     Comment: A month    Drug use: Never    Sexual activity: Yes     Partners: Female     Birth control/protection: Same-sex partner     Comment: 1 partner currently, long term relationship       Objective   /76   Pulse 86   Ht 153 cm (60.24\")   Wt 68.5 kg (151 lb)   SpO2 98%   BMI 29.26 kg/m²             Physical Exam  Constitutional:       General: She is not in acute distress.  HENT:      Mouth/Throat:      Mouth: Mucous membranes are moist.   Eyes:      Conjunctiva/sclera: Conjunctivae normal.   Pulmonary:      Effort: Pulmonary effort is normal.   Neurological:      Mental Status: She is alert.   Psychiatric:         Thought Content: Thought content normal.         Judgment: Judgment normal.      Comments: flat         Assessment & Plan     1. Anxiety, Depression, Panic Disorder  - patient reports that she was dx with Borderline Personality DO around time of previous suicide attempt, however subsequently Psych was not sure that this was accurate  - failed multiple SSRI, SNRI, Mood stabilizers, see previous note for listing  - family history of bipolar and feels does cycle " from depression to having energy to do things, though never dx BAD and does not exhibit true rocio  - Gene Sight testing completed  - no SI/HI  - will continue Viibryd 40mg  - will start Wellbutrin 150mg XL  - will FU in 4 weeks  - call if any worsening of sx and stop med     2. Overweight BMI 29  - discussed lifestyle changes that she is making  - she feels some of the increased eating is emotional eating  - will start Wellbutrin to try to help with mood and weight    Radha Bradley MD, FAAP, FACP  Internal Medicine and Pediatrics  [facility]

## 2024-05-08 ENCOUNTER — TELEPHONE (OUTPATIENT)
Age: 29
End: 2024-05-08
Payer: COMMERCIAL

## 2024-05-08 DIAGNOSIS — J30.9 ALLERGIC RHINITIS, UNSPECIFIED SEASONALITY, UNSPECIFIED TRIGGER: ICD-10-CM

## 2024-05-08 DIAGNOSIS — F41.9 ANXIETY: ICD-10-CM

## 2024-05-08 RX ORDER — VILAZODONE HYDROCHLORIDE 40 MG/1
40 TABLET ORAL DAILY
Qty: 30 TABLET | Refills: 1 | Status: SHIPPED | OUTPATIENT
Start: 2024-05-08

## 2024-05-08 RX ORDER — MOMETASONE FUROATE 50 UG/1
2 SPRAY, METERED NASAL DAILY
Qty: 17 G | Refills: 3 | Status: SHIPPED | OUTPATIENT
Start: 2024-05-08

## 2024-06-11 ENCOUNTER — OFFICE VISIT (OUTPATIENT)
Age: 29
End: 2024-06-11
Payer: COMMERCIAL

## 2024-06-11 VITALS
BODY MASS INDEX: 30.04 KG/M2 | OXYGEN SATURATION: 98 % | DIASTOLIC BLOOD PRESSURE: 68 MMHG | HEIGHT: 60 IN | HEART RATE: 83 BPM | SYSTOLIC BLOOD PRESSURE: 116 MMHG | WEIGHT: 153 LBS

## 2024-06-11 DIAGNOSIS — H69.93 DYSFUNCTION OF BOTH EUSTACHIAN TUBES: ICD-10-CM

## 2024-06-11 DIAGNOSIS — H65.93 BILATERAL OTITIS MEDIA WITH EFFUSION: ICD-10-CM

## 2024-06-11 DIAGNOSIS — G44.209 TENSION HEADACHE: ICD-10-CM

## 2024-06-11 DIAGNOSIS — R00.2 PALPITATIONS: Primary | ICD-10-CM

## 2024-06-11 PROCEDURE — 93000 ELECTROCARDIOGRAM COMPLETE: CPT | Performed by: INTERNAL MEDICINE

## 2024-06-11 PROCEDURE — 99213 OFFICE O/P EST LOW 20 MIN: CPT | Performed by: INTERNAL MEDICINE

## 2024-06-12 NOTE — PROGRESS NOTES
Progress Note    Subjective      Melissa is a 29 y.o. female.    Chief Complaint   Patient presents with    Dizziness    Palpitations    Migraine     For a few weeks, been going on and off.        Dizziness    Palpitations   Associated symptoms include dizziness.   Migraine      Dizziness  - acute onset  - feels fluid moving in the ear, worse on the right  - using antihistamine and nasal steroid  - no illness sx  - no hearing loss  - some intermittent tinnitus at baseline  - with head movement it comes on suddenly  - short lived  - feels more off balance and sometimes clumsy then room spinning    Headache  - the past couple of weeks had headache with pain on top of head and moving down bilaterally in back of head and back of neck  - prepping to move  - does not symptomatically feel that she is anxious consciously but she recently stopped Viibryd and Wellbutrin after weaning off bc did not see big benefit   - no vomiting  - no sensitivity to light or sound  - no neuro sx  - muscle feels tight  - used Ibuprofen 400mg     Palpitations  - noticed a fast HR, took it this AM and 107  - sometimes notes palps that feel irregular  - no LOC, presyncope, CP  - have happened in past  - drinks caffeine and notices makes it worse  - hydrates well    Past Medical History:  Patient Active Problem List   Diagnosis    Allergic rhinitis    PCOS (polycystic ovarian syndrome)    Acute non-recurrent maxillary sinusitis    Deviated septum    Anxiety    Dysfunction of eustachian tube    Dysfunction of both eustachian tubes    Other irritable bowel syndrome    Borderline personality disorder    Ocular migraine    Panic disorder    Plantar fasciitis    Overweight with body mass index (BMI) of 28 to 28.9 in adult    Moderate episode of recurrent major depressive disorder    Overweight with body mass index (BMI) of 29 to 29.9 in adult       Medications:  Current Outpatient Medications on File Prior to Visit   Medication Sig Dispense Refill     azelastine (ASTELIN) 0.1 % nasal spray 1 spray into the nostril(s) as directed by provider 2 (Two) Times a Day. 30 mL 3    LORazepam (Ativan) 0.5 MG tablet Take 1 tablet by mouth Every 6 (Six) Hours As Needed.      metFORMIN ER (GLUCOPHAGE-XR) 500 MG 24 hr tablet Take 1 tablet by mouth 2 (Two) Times a Day. One in the morning and one at night 180 tablet 1    mometasone (NASONEX) 50 MCG/ACT nasal spray 2 sprays into the nostril(s) as directed by provider Daily. 17 g 3    [DISCONTINUED] buPROPion XL (Wellbutrin XL) 150 MG 24 hr tablet Take 1 tablet by mouth Daily. (Patient not taking: Reported on 6/11/2024) 30 tablet 1    [DISCONTINUED] methylPREDNISolone (MEDROL) 4 MG dose pack Take 1 tablet by mouth Daily.      [DISCONTINUED] vilazodone (VIIBRYD) 40 MG tablet tablet Take 1 tablet by mouth Daily. (Patient not taking: Reported on 6/11/2024) 30 tablet 1     No current facility-administered medications on file prior to visit.       Allergies:   No Known Allergies    Immunizations:  Immunization History   Administered Date(s) Administered    Flublok 18+yrs 09/28/2023    Tdap 03/18/2021        Family History:  Family History   Problem Relation Age of Onset    Diabetes Father     Hyperlipidemia Father     Mental illness Paternal Grandmother     Breast cancer Other         Great AUNT, older at dx    Colon cancer Neg Hx     Ovarian cancer Neg Hx     Coronary artery disease Neg Hx     Stroke Neg Hx        Social History:  Social History     Socioeconomic History    Marital status: Single   Tobacco Use    Smoking status: Never    Smokeless tobacco: Never   Vaping Use    Vaping status: Never Used   Substance and Sexual Activity    Alcohol use: Yes     Alcohol/week: 2.0 - 3.0 standard drinks of alcohol     Types: 2 - 3 Drinks containing 0.5 oz of alcohol per week     Comment: A month    Drug use: Never    Sexual activity: Yes     Partners: Female     Birth control/protection: Same-sex partner     Comment: 1 partner currently,  "long term relationship       Objective   /68   Pulse 83   Ht 153 cm (60.24\")   Wt 69.4 kg (153 lb)   SpO2 98%   BMI 29.65 kg/m²             Physical Exam  Vitals reviewed.   Constitutional:       General: She is not in acute distress.  HENT:      Right Ear: Ear canal and external ear normal.      Left Ear: Ear canal and external ear normal.      Ears:      Comments: Bilateral dull LR consistent with effusion     Nose: Nose normal.      Mouth/Throat:      Mouth: Mucous membranes are moist.   Eyes:      Conjunctiva/sclera: Conjunctivae normal.   Cardiovascular:      Rate and Rhythm: Normal rate and regular rhythm.      Heart sounds: Normal heart sounds. No murmur heard.  Pulmonary:      Effort: Pulmonary effort is normal. No respiratory distress.      Breath sounds: Normal breath sounds.   Abdominal:      General: Abdomen is flat. Bowel sounds are normal.      Palpations: Abdomen is soft.   Neurological:      Mental Status: She is alert.      Comments: [place] Hallpike negative dominic   Psychiatric:         Thought Content: Thought content normal.         Judgment: Judgment normal.         ECG 12 Lead    Date/Time: 6/11/2024 11:09 PM  Performed by: Radha Bradley MD    Authorized by: Radha Bradley MD  Comparison: not compared with previous ECG   Previous ECG: no previous ECG available  Rhythm: sinus rhythm  Rate: normal  Conduction: conduction normal  ST Segments: ST segments normal  T Waves: T waves normal  QRS axis: normal    Clinical impression: normal ECG          Assessment & Plan     OME  ETD  - suspect that imbalance and dizziness is due to bilateral OME  - continue antihistamine and nasal steroid  - [place] Hallpike negative  - discussed usign Benadryl or Dimetapp PRN to help dry up ears  - if persistent could discuss ear tube placement    Palpitations  - not consciously feeling anxious but recently weaned to off from Welbutrin and Viibryd  - EKG today NSR  - no LOC, presyncope, CP  - decrease " caffeine  - continue hydration   - discussed if persistent could consider Holter  - CBC, CMP, thyroid WNL in 3/2024    Tension Headache  - continue Ibuprofen PRN  - continue stretching  - does not seem consistent with migraine      Radha Bradley MD, FAAP, FACP  Internal Medicine and Pediatrics  [facility] [place]

## 2024-06-18 ENCOUNTER — TELEPHONE (OUTPATIENT)
Age: 29
End: 2024-06-18
Payer: COMMERCIAL

## 2024-06-18 DIAGNOSIS — E28.2 PCOS (POLYCYSTIC OVARIAN SYNDROME): ICD-10-CM

## 2024-06-18 DIAGNOSIS — J30.9 ALLERGIC RHINITIS, UNSPECIFIED SEASONALITY, UNSPECIFIED TRIGGER: ICD-10-CM

## 2024-06-18 RX ORDER — METFORMIN HYDROCHLORIDE 500 MG/1
500 TABLET, EXTENDED RELEASE ORAL 2 TIMES DAILY
Qty: 180 TABLET | Refills: 1 | Status: SHIPPED | OUTPATIENT
Start: 2024-06-18

## 2024-06-18 RX ORDER — MOMETASONE FUROATE 50 UG/1
2 SPRAY, METERED NASAL DAILY
Qty: 17 G | Refills: 3 | Status: SHIPPED | OUTPATIENT
Start: 2024-06-18

## 2024-06-18 RX ORDER — AZELASTINE 1 MG/ML
1 SPRAY, METERED NASAL 2 TIMES DAILY
Qty: 30 ML | Refills: 3 | Status: SHIPPED | OUTPATIENT
Start: 2024-06-18

## 2024-06-18 NOTE — TELEPHONE ENCOUNTER
Rx Refill Note  Requested Prescriptions     Pending Prescriptions Disp Refills    mometasone (NASONEX) 50 MCG/ACT nasal spray 17 g 3     Si sprays into the nostril(s) as directed by provider Daily.    azelastine (ASTELIN) 0.1 % nasal spray 30 mL 3     Si spray into the nostril(s) as directed by provider 2 (Two) Times a Day.    metFORMIN ER (GLUCOPHAGE-XR) 500 MG 24 hr tablet 180 tablet 1     Sig: Take 1 tablet by mouth 2 (Two) Times a Day. One in the morning and one at night      Last office visit with prescribing clinician: 2024   Last telemedicine visit with prescribing clinician: Visit date not found   Next office visit with prescribing clinician: Visit date not found   =  iNda Cabrera MA  24, 10:48 EDT